# Patient Record
Sex: FEMALE | Race: BLACK OR AFRICAN AMERICAN | NOT HISPANIC OR LATINO | Employment: OTHER | URBAN - METROPOLITAN AREA
[De-identification: names, ages, dates, MRNs, and addresses within clinical notes are randomized per-mention and may not be internally consistent; named-entity substitution may affect disease eponyms.]

---

## 2017-03-29 RX ORDER — TRIAMTERENE AND HYDROCHLOROTHIAZIDE 37.5; 25 MG/1; MG/1
TABLET ORAL
Qty: 90 TABLET | Refills: 1 | Status: SHIPPED | OUTPATIENT
Start: 2017-03-29 | End: 2019-09-16 | Stop reason: ALTCHOICE

## 2018-04-09 RX ORDER — TRIAMTERENE AND HYDROCHLOROTHIAZIDE 37.5; 25 MG/1; MG/1
TABLET ORAL
Qty: 90 TABLET | Refills: 0 | OUTPATIENT
Start: 2018-04-09

## 2018-06-18 ENCOUNTER — OFFICE VISIT (OUTPATIENT)
Dept: OPHTHALMOLOGY | Age: 63
End: 2018-06-18

## 2018-06-18 DIAGNOSIS — H26.40 AFTER-CATARACT OF BOTH EYES: ICD-10-CM

## 2018-06-18 DIAGNOSIS — H43.811 PVD (POSTERIOR VITREOUS DETACHMENT), RIGHT EYE: Primary | ICD-10-CM

## 2018-06-18 PROCEDURE — 99213 OFFICE O/P EST LOW 20 MIN: CPT | Performed by: OPHTHALMOLOGY

## 2018-06-18 ASSESSMENT — REFRACTION_WEARINGRX
OS_SPHERE: +1.75
OD_AXIS: 153
SPECS_TYPE: NEAR SINGLE VISION
OS_AXIS: 007
OS_CYLINDER: +0.25
OD_SPHERE: +1.50
OD_CYLINDER: +0.75

## 2018-06-18 ASSESSMENT — VISUAL ACUITY
CORRECTION_TYPE: GLASSES
METHOD: SNELLEN - LINEAR
OS_CC: 20/20
OS_SC: 20/20
OD_SC: 20/20
OD_CC: 20/20

## 2018-06-18 ASSESSMENT — TONOMETRY
IOP_METHOD: APPLANATION
OS_IOP_MMHG: 10
OD_IOP_MMHG: 12

## 2018-09-04 ENCOUNTER — OFFICE VISIT (OUTPATIENT)
Dept: DERMATOLOGY | Age: 63
End: 2018-09-04

## 2018-09-04 DIAGNOSIS — L82.1 DERMATOSIS PAPULOSA NIGRA: ICD-10-CM

## 2018-09-04 DIAGNOSIS — L82.1 SEBORRHEIC KERATOSIS: ICD-10-CM

## 2018-09-04 DIAGNOSIS — D23.9 DERMATOFIBROMA: ICD-10-CM

## 2018-09-04 DIAGNOSIS — L85.9 HYPERKERATOSIS: ICD-10-CM

## 2018-09-04 DIAGNOSIS — L21.9 SEBORRHEIC DERMATITIS: Primary | ICD-10-CM

## 2018-09-04 PROCEDURE — 99214 OFFICE O/P EST MOD 30 MIN: CPT | Performed by: DERMATOLOGY

## 2018-09-04 RX ORDER — KETOCONAZOLE 20 MG/G
CREAM TOPICAL
Qty: 30 G | Refills: 3 | Status: SHIPPED | OUTPATIENT
Start: 2018-09-04 | End: 2019-09-16 | Stop reason: ALTCHOICE

## 2018-09-05 ENCOUNTER — TELEPHONE (OUTPATIENT)
Dept: ORTHOPEDICS | Age: 63
End: 2018-09-05

## 2019-09-16 ENCOUNTER — OFFICE VISIT (OUTPATIENT)
Dept: OPHTHALMOLOGY | Age: 64
End: 2019-09-16

## 2019-09-16 DIAGNOSIS — H16.213 EXPOSURE KERATOCONJUNCTIVITIS OF BOTH EYES: ICD-10-CM

## 2019-09-16 DIAGNOSIS — Z96.1 HISTORY OF POSTERIOR CHAMBER INTRAOCULAR LENS IMPLANTATION: ICD-10-CM

## 2019-09-16 DIAGNOSIS — H43.813 PVD (POSTERIOR VITREOUS DETACHMENT), BOTH EYES: Primary | ICD-10-CM

## 2019-09-16 PROCEDURE — 92004 COMPRE OPH EXAM NEW PT 1/>: CPT | Performed by: OPTOMETRIST

## 2019-09-16 ASSESSMENT — EXTERNAL EXAM - RIGHT EYE: OD_EXAM: 1+ BROW PTOSIS

## 2019-09-16 ASSESSMENT — TONOMETRY
OS_IOP_MMHG: 18
OD_IOP_MMHG: 16
IOP_METHOD: NON-CONTACT AIR PUFF

## 2019-09-16 ASSESSMENT — REFRACTION_MANIFEST
OS_AXIS: 003
OD_AXIS: 049
OS_SPHERE: -0.25
OS_CYLINDER: +0.25
OD_SPHERE: -0.75
METHOD_AUTOREFRACTION: 1
OD_CYLINDER: +0.25

## 2019-09-16 ASSESSMENT — REFRACTION_WEARINGRX
OD_AXIS: 159
OS_AXIS: 006
OS_CYLINDER: +0.50
OD_SPHERE: +1.25
SPECS_TYPE: NEAR SINGLE VISION
OS_SPHERE: +1.75
OD_CYLINDER: +0.75

## 2019-09-16 ASSESSMENT — CUP TO DISC RATIO
OS_RATIO: 0.35
OD_RATIO: 0.35

## 2019-09-16 ASSESSMENT — EXTERNAL EXAM - LEFT EYE: OS_EXAM: 1+ BROW PTOSIS

## 2019-09-16 ASSESSMENT — SLIT LAMP EXAM - LIDS
COMMENTS: 1+ DERMATOCHALASIS - UPPER LID
COMMENTS: 1+ DERMATOCHALASIS - UPPER LID

## 2019-09-16 ASSESSMENT — VISUAL ACUITY
OS_SC: 20/25
OD_SC: 20/20
OS_SC+: +2
OS_CC: 20/25
OD_CC: 20/20
METHOD: SNELLEN - LINEAR

## 2019-09-24 ENCOUNTER — OFFICE VISIT (OUTPATIENT)
Dept: OPHTHALMOLOGY | Age: 64
End: 2019-09-24

## 2019-09-24 DIAGNOSIS — H57.10 PAIN IN EYE, UNSPECIFIED LATERALITY: Primary | ICD-10-CM

## 2019-09-24 DIAGNOSIS — T15.11XA FOREIGN BODY OF RIGHT CONJUNCTIVAL SAC, INITIAL ENCOUNTER: ICD-10-CM

## 2019-09-24 PROCEDURE — 99214 OFFICE O/P EST MOD 30 MIN: CPT | Performed by: OPTOMETRIST

## 2019-09-24 ASSESSMENT — VISUAL ACUITY
METHOD: SNELLEN - LINEAR
OD_SC: 20/20
OS_SC: 20

## 2022-08-02 ENCOUNTER — OFFICE (OUTPATIENT)
Dept: URBAN - METROPOLITAN AREA CLINIC 11 | Facility: CLINIC | Age: 67
End: 2022-08-02

## 2022-08-02 VITALS
HEART RATE: 73 BPM | DIASTOLIC BLOOD PRESSURE: 63 MMHG | HEIGHT: 66 IN | WEIGHT: 191 LBS | SYSTOLIC BLOOD PRESSURE: 122 MMHG | OXYGEN SATURATION: 98 %

## 2022-08-02 DIAGNOSIS — R13.19 OTHER DYSPHAGIA: ICD-10-CM

## 2022-08-02 DIAGNOSIS — K21.9 GASTRO-ESOPHAGEAL REFLUX DISEASE WITHOUT ESOPHAGITIS: ICD-10-CM

## 2022-08-02 DIAGNOSIS — Z80.0 FAMILY HISTORY OF MALIGNANT NEOPLASM OF DIGESTIVE ORGANS: ICD-10-CM

## 2022-08-02 DIAGNOSIS — Z86.010 PERSONAL HISTORY OF COLONIC POLYPS: ICD-10-CM

## 2022-08-02 PROCEDURE — 99204 OFFICE O/P NEW MOD 45 MIN: CPT | Performed by: INTERNAL MEDICINE

## 2022-09-09 ENCOUNTER — AMBULATORY SURGICAL CENTER (OUTPATIENT)
Dept: URBAN - METROPOLITAN AREA SURGERY 3 | Facility: SURGERY | Age: 67
End: 2022-09-09
Payer: COMMERCIAL

## 2022-09-09 ENCOUNTER — AMBULATORY SURGICAL CENTER (OUTPATIENT)
Dept: URBAN - METROPOLITAN AREA SURGERY 3 | Facility: SURGERY | Age: 67
End: 2022-09-09

## 2022-09-09 VITALS
RESPIRATION RATE: 22 BRPM | RESPIRATION RATE: 21 BRPM | HEART RATE: 60 BPM | RESPIRATION RATE: 22 BRPM | SYSTOLIC BLOOD PRESSURE: 120 MMHG | TEMPERATURE: 98.1 F | HEART RATE: 73 BPM | TEMPERATURE: 98 F | HEIGHT: 66 IN | DIASTOLIC BLOOD PRESSURE: 86 MMHG | HEART RATE: 69 BPM | SYSTOLIC BLOOD PRESSURE: 139 MMHG | DIASTOLIC BLOOD PRESSURE: 82 MMHG | TEMPERATURE: 98 F | RESPIRATION RATE: 14 BRPM | OXYGEN SATURATION: 94 % | HEART RATE: 68 BPM | HEART RATE: 60 BPM | DIASTOLIC BLOOD PRESSURE: 85 MMHG | RESPIRATION RATE: 18 BRPM | HEART RATE: 65 BPM | RESPIRATION RATE: 18 BRPM | SYSTOLIC BLOOD PRESSURE: 139 MMHG | DIASTOLIC BLOOD PRESSURE: 76 MMHG | OXYGEN SATURATION: 97 % | DIASTOLIC BLOOD PRESSURE: 82 MMHG | DIASTOLIC BLOOD PRESSURE: 78 MMHG | HEART RATE: 73 BPM | DIASTOLIC BLOOD PRESSURE: 86 MMHG | OXYGEN SATURATION: 95 % | WEIGHT: 192 LBS | SYSTOLIC BLOOD PRESSURE: 120 MMHG | TEMPERATURE: 98.1 F | SYSTOLIC BLOOD PRESSURE: 122 MMHG | SYSTOLIC BLOOD PRESSURE: 122 MMHG | DIASTOLIC BLOOD PRESSURE: 76 MMHG | HEART RATE: 73 BPM | HEART RATE: 68 BPM | OXYGEN SATURATION: 96 % | RESPIRATION RATE: 18 BRPM | HEART RATE: 65 BPM | HEART RATE: 60 BPM | OXYGEN SATURATION: 94 % | OXYGEN SATURATION: 96 % | RESPIRATION RATE: 22 BRPM | DIASTOLIC BLOOD PRESSURE: 82 MMHG | SYSTOLIC BLOOD PRESSURE: 136 MMHG | RESPIRATION RATE: 21 BRPM | OXYGEN SATURATION: 95 % | SYSTOLIC BLOOD PRESSURE: 139 MMHG | RESPIRATION RATE: 14 BRPM | OXYGEN SATURATION: 97 % | HEIGHT: 66 IN | DIASTOLIC BLOOD PRESSURE: 78 MMHG | RESPIRATION RATE: 14 BRPM | OXYGEN SATURATION: 97 % | TEMPERATURE: 98.1 F | OXYGEN SATURATION: 96 % | HEART RATE: 68 BPM | TEMPERATURE: 98 F | DIASTOLIC BLOOD PRESSURE: 86 MMHG | HEIGHT: 66 IN | HEART RATE: 69 BPM | SYSTOLIC BLOOD PRESSURE: 122 MMHG | SYSTOLIC BLOOD PRESSURE: 136 MMHG | SYSTOLIC BLOOD PRESSURE: 120 MMHG | OXYGEN SATURATION: 94 % | HEART RATE: 69 BPM | RESPIRATION RATE: 21 BRPM | SYSTOLIC BLOOD PRESSURE: 136 MMHG | DIASTOLIC BLOOD PRESSURE: 76 MMHG | DIASTOLIC BLOOD PRESSURE: 85 MMHG | WEIGHT: 192 LBS | DIASTOLIC BLOOD PRESSURE: 78 MMHG | WEIGHT: 192 LBS | HEART RATE: 65 BPM | DIASTOLIC BLOOD PRESSURE: 85 MMHG | OXYGEN SATURATION: 95 %

## 2022-09-09 DIAGNOSIS — K22.2 ESOPHAGEAL OBSTRUCTION: ICD-10-CM

## 2022-09-09 PROCEDURE — 43248 EGD GUIDE WIRE INSERTION: CPT | Performed by: INTERNAL MEDICINE

## 2022-09-09 NOTE — SERVICEHPINOTES
66-year-old  female referred to us by Dr. Ann with history of having moved to [place] from [place] to be close to her daughter. She has had colonoscopies twice with polyps both times the last in 2021 and she has family history of a sister who had colon cancer in her late 50s. She presents with a long-term history of reflux issues for many years on proton pump inhibitors for many years but weaned herself off of that and uses cinnamon capsules to try to control her reflux. This does not work well all the times and she is having breakthrough symptoms.  she actually still takes omeprazole 20 mg daily. She now has dysphagia to solids primarily with some issues with liquids. No definite food impactions. She has gained weight rather than lost. There is no sign of any bleeding or melena. She does not have aspiration episodes at night. She has no cardiac history with no chest pain or shortness of breath.

## 2022-09-09 NOTE — SERVICEHPINOTES
66-year-old  female referred to us by Dr. Ann with history of having moved to [place] from [place] to be close to her daughter. She has had colonoscopies twice with polyps both times the last in 2021 and she has family history of a sister who had colon cancer in her late 50s. She presents with a long-term history of reflux issues for many years on proton pump inhibitors for many years but weaned herself off of that and uses cinnamon capsules to try to control her reflux. This does not work well all the times and she is having breakthrough symptoms.  she actually still takes omeprazole 20 mg daily. She now has dysphagia to solids primarily with some issues with liquids. No definite food impactions. She has gained weight rather than lost. There is no sign of any bleeding or melena. She does not have aspiration episodes at night. She has no cardiac history with no chest pain or shortness of breath.

## 2022-09-09 NOTE — SERVICEHPINOTES
66-year-old  female referred to us by Dr. Ann with history of having moved to [place] from [place] to be close to her daughter. She has had colonoscopies twice with polyps both times the last in 2021 and she has family history of a sister who had colon cancer in her late 50s. She presents with a long-term history of reflux issues for many years on proton pump inhibitors for many years but weaned herself off of that and uses cinnamon capsules to try to control her reflux. This does not work well all the times and she is having breakthrough symptoms.  she actually still takes omeprazole 20 mg daily. She now has dysphagia to solids primarily with some issues with liquids. No definite food impactions. She has gained weight rather than lost. There is no sign of any bleeding or melena. She does not have aspiration episodes at night. She has no cardiac history with no chest pain or shortness of breath.

## 2023-03-21 ENCOUNTER — OFFICE (OUTPATIENT)
Dept: URBAN - METROPOLITAN AREA CLINIC 11 | Facility: CLINIC | Age: 68
End: 2023-03-21

## 2023-03-21 VITALS
HEART RATE: 67 BPM | HEIGHT: 66 IN | WEIGHT: 191 LBS | OXYGEN SATURATION: 100 % | DIASTOLIC BLOOD PRESSURE: 83 MMHG | SYSTOLIC BLOOD PRESSURE: 138 MMHG

## 2023-03-21 DIAGNOSIS — K21.9 GASTRO-ESOPHAGEAL REFLUX DISEASE WITHOUT ESOPHAGITIS: ICD-10-CM

## 2023-03-21 DIAGNOSIS — R13.19 OTHER DYSPHAGIA: ICD-10-CM

## 2023-03-21 PROCEDURE — 99213 OFFICE O/P EST LOW 20 MIN: CPT | Performed by: INTERNAL MEDICINE

## 2023-03-21 RX ORDER — LANSOPRAZOLE 30 MG/1
CAPSULE, DELAYED RELEASE ORAL
Qty: 30 | Refills: 6 | Status: ACTIVE
Start: 2023-03-21

## 2024-12-19 ENCOUNTER — AMBULATORY SURGICAL CENTER (OUTPATIENT)
Dept: URBAN - METROPOLITAN AREA SURGERY 2 | Facility: SURGERY | Age: 69
End: 2024-12-19
Payer: COMMERCIAL

## 2024-12-19 ENCOUNTER — OFFICE (OUTPATIENT)
Dept: URBAN - METROPOLITAN AREA PATHOLOGY 12 | Facility: PATHOLOGY | Age: 69
End: 2024-12-19
Payer: COMMERCIAL

## 2024-12-19 VITALS
HEART RATE: 63 BPM | DIASTOLIC BLOOD PRESSURE: 67 MMHG | HEART RATE: 67 BPM | RESPIRATION RATE: 13 BRPM | HEART RATE: 69 BPM | DIASTOLIC BLOOD PRESSURE: 71 MMHG | HEART RATE: 60 BPM | WEIGHT: 171 LBS | SYSTOLIC BLOOD PRESSURE: 123 MMHG | SYSTOLIC BLOOD PRESSURE: 122 MMHG | HEART RATE: 72 BPM | SYSTOLIC BLOOD PRESSURE: 101 MMHG | SYSTOLIC BLOOD PRESSURE: 115 MMHG | DIASTOLIC BLOOD PRESSURE: 59 MMHG | RESPIRATION RATE: 18 BRPM | RESPIRATION RATE: 15 BRPM | OXYGEN SATURATION: 98 % | DIASTOLIC BLOOD PRESSURE: 72 MMHG | SYSTOLIC BLOOD PRESSURE: 131 MMHG | OXYGEN SATURATION: 100 % | HEART RATE: 82 BPM | HEIGHT: 66 IN | TEMPERATURE: 98.2 F | SYSTOLIC BLOOD PRESSURE: 137 MMHG | RESPIRATION RATE: 16 BRPM | OXYGEN SATURATION: 99 % | DIASTOLIC BLOOD PRESSURE: 76 MMHG

## 2024-12-19 DIAGNOSIS — Z86.0100 PERSONAL HISTORY OF COLON POLYPS, UNSPECIFIED: ICD-10-CM

## 2024-12-19 DIAGNOSIS — R13.19 OTHER DYSPHAGIA: ICD-10-CM

## 2024-12-19 DIAGNOSIS — R14.2 ERUCTATION: ICD-10-CM

## 2024-12-19 DIAGNOSIS — Z09 ENCOUNTER FOR FOLLOW-UP EXAMINATION AFTER COMPLETED TREATMEN: ICD-10-CM

## 2024-12-19 DIAGNOSIS — R13.10 DYSPHAGIA, UNSPECIFIED: ICD-10-CM

## 2024-12-19 PROBLEM — K57.30 DIVERTICULOSIS OF LARGE INTESTINE WITHOUT PERFORATION OR ABS: Status: ACTIVE | Noted: 2024-12-19

## 2024-12-19 PROCEDURE — 88305 TISSUE EXAM BY PATHOLOGIST: CPT | Performed by: PATHOLOGY

## 2024-12-19 PROCEDURE — G0105 COLORECTAL SCRN; HI RISK IND: HCPCS | Performed by: INTERNAL MEDICINE

## 2024-12-19 PROCEDURE — 43248 EGD GUIDE WIRE INSERTION: CPT | Mod: 51 | Performed by: INTERNAL MEDICINE

## 2024-12-19 RX ADMIN — PROPOFOL 250 MG: 10 INJECTION, EMULSION INTRAVENOUS at 15:06

## 2024-12-23 LAB — GASTRO ONE PATHOLOGY: PDF REPORT: (no result)

## 2025-05-12 ENCOUNTER — OFFICE (OUTPATIENT)
Dept: URBAN - METROPOLITAN AREA CLINIC 19 | Facility: CLINIC | Age: 70
End: 2025-05-12
Payer: COMMERCIAL

## 2025-05-12 VITALS
SYSTOLIC BLOOD PRESSURE: 123 MMHG | OXYGEN SATURATION: 99 % | HEART RATE: 75 BPM | SYSTOLIC BLOOD PRESSURE: 122 MMHG | DIASTOLIC BLOOD PRESSURE: 90 MMHG | DIASTOLIC BLOOD PRESSURE: 88 MMHG | WEIGHT: 175 LBS | HEIGHT: 66 IN

## 2025-05-12 DIAGNOSIS — Z80.0 FAMILY HISTORY OF MALIGNANT NEOPLASM OF DIGESTIVE ORGANS: ICD-10-CM

## 2025-05-12 DIAGNOSIS — K22.2 ESOPHAGEAL OBSTRUCTION: ICD-10-CM

## 2025-05-12 DIAGNOSIS — K21.9 GASTRO-ESOPHAGEAL REFLUX DISEASE WITHOUT ESOPHAGITIS: ICD-10-CM

## 2025-05-12 DIAGNOSIS — R19.4 CHANGE IN BOWEL HABIT: ICD-10-CM

## 2025-05-12 PROCEDURE — 99214 OFFICE O/P EST MOD 30 MIN: CPT | Performed by: INTERNAL MEDICINE
